# Patient Record
Sex: FEMALE | Race: WHITE | NOT HISPANIC OR LATINO | Employment: OTHER | URBAN - METROPOLITAN AREA
[De-identification: names, ages, dates, MRNs, and addresses within clinical notes are randomized per-mention and may not be internally consistent; named-entity substitution may affect disease eponyms.]

---

## 2017-01-30 ENCOUNTER — ALLSCRIPTS OFFICE VISIT (OUTPATIENT)
Dept: OTHER | Facility: OTHER | Age: 72
End: 2017-01-30

## 2017-06-21 ENCOUNTER — GENERIC CONVERSION - ENCOUNTER (OUTPATIENT)
Dept: OTHER | Facility: OTHER | Age: 72
End: 2017-06-21

## 2017-07-30 DIAGNOSIS — E78.5 HYPERLIPIDEMIA: ICD-10-CM

## 2017-08-21 ENCOUNTER — ALLSCRIPTS OFFICE VISIT (OUTPATIENT)
Dept: OTHER | Facility: OTHER | Age: 72
End: 2017-08-21

## 2018-01-13 VITALS — RESPIRATION RATE: 18 BRPM | DIASTOLIC BLOOD PRESSURE: 76 MMHG | SYSTOLIC BLOOD PRESSURE: 124 MMHG | HEART RATE: 76 BPM

## 2018-01-13 VITALS — RESPIRATION RATE: 16 BRPM | HEART RATE: 60 BPM | SYSTOLIC BLOOD PRESSURE: 130 MMHG | DIASTOLIC BLOOD PRESSURE: 80 MMHG

## 2018-01-21 DIAGNOSIS — E78.5 HYPERLIPIDEMIA: ICD-10-CM

## 2018-01-21 DIAGNOSIS — R03.0 ELEVATED BLOOD PRESSURE READING WITHOUT DIAGNOSIS OF HYPERTENSION: ICD-10-CM

## 2018-01-21 DIAGNOSIS — Z13.29 ENCOUNTER FOR SCREENING FOR OTHER SUSPECTED ENDOCRINE DISORDER: ICD-10-CM

## 2018-01-25 DIAGNOSIS — E78.5 HYPERLIPIDEMIA, UNSPECIFIED HYPERLIPIDEMIA TYPE: Primary | ICD-10-CM

## 2018-01-25 RX ORDER — ATORVASTATIN CALCIUM 20 MG/1
1 TABLET, FILM COATED ORAL DAILY
COMMUNITY
Start: 2015-06-30 | End: 2018-01-25 | Stop reason: SDUPTHER

## 2018-01-26 RX ORDER — ATORVASTATIN CALCIUM 20 MG/1
20 TABLET, FILM COATED ORAL DAILY
Qty: 90 TABLET | Refills: 1 | Status: SHIPPED | OUTPATIENT
Start: 2018-01-26 | End: 2018-06-11 | Stop reason: SDUPTHER

## 2018-03-05 ENCOUNTER — OFFICE VISIT (OUTPATIENT)
Dept: FAMILY MEDICINE CLINIC | Facility: MEDICAL CENTER | Age: 73
End: 2018-03-05
Payer: MEDICARE

## 2018-03-05 VITALS
WEIGHT: 236.6 LBS | RESPIRATION RATE: 18 BRPM | HEART RATE: 80 BPM | BODY MASS INDEX: 34.44 KG/M2 | SYSTOLIC BLOOD PRESSURE: 130 MMHG | DIASTOLIC BLOOD PRESSURE: 76 MMHG

## 2018-03-05 DIAGNOSIS — E78.01 FAMILIAL HYPERCHOLESTEROLEMIA: ICD-10-CM

## 2018-03-05 DIAGNOSIS — R03.0 ELEVATED BP WITHOUT DIAGNOSIS OF HYPERTENSION: Primary | ICD-10-CM

## 2018-03-05 DIAGNOSIS — E66.9 CLASS 1 OBESITY WITHOUT SERIOUS COMORBIDITY WITH BODY MASS INDEX (BMI) OF 34.0 TO 34.9 IN ADULT, UNSPECIFIED OBESITY TYPE: ICD-10-CM

## 2018-03-05 PROCEDURE — 99214 OFFICE O/P EST MOD 30 MIN: CPT | Performed by: FAMILY MEDICINE

## 2018-03-05 RX ORDER — ASPIRIN 81 MG/1
TABLET ORAL
COMMUNITY

## 2018-03-05 RX ORDER — BIOTIN 1 MG
1000 TABLET ORAL
COMMUNITY

## 2018-03-05 RX ORDER — CHLORAL HYDRATE 500 MG
CAPSULE ORAL
COMMUNITY

## 2018-03-05 RX ORDER — PHENOL 1.4 %
AEROSOL, SPRAY (ML) MUCOUS MEMBRANE
COMMUNITY

## 2018-03-06 NOTE — PROGRESS NOTES
Derrick Sewell has been doing well overall  She does not really have any complaints  She still does irregular salt be weighed but she did today  She said that her weight is about the same  She realizes she needs to lose however it has always been a struggle  She has been trying to exercise and tries to get out and walk for 20-30 minutes even with the weather is cold  She no longer needs to see oncology  O: /76 (BP Location: Left arm, Patient Position: Sitting, Cuff Size: Large)   Pulse 80   Resp 18   Wt 107 kg (236 lb 9 6 oz)   BMI 34 44 kg/m²   Neck no adenopathy thyromegaly bruits  Chest clear  Cardiac regular rate without murmur  Abdomen benign  Extremities  No edema    BW 2/23/28  Hgb 44 6  Hct 15 0  Chol 180    Assessment   1  Hyperlipidemia-controlled  2  Elevated hemoglobin hematocrit-overall increases really not that great in the last 6 years  If continues to increase will refer back to her hematology oncologist for possible polycythemia  3   Overweight-discussed diet and exercise  4  Health maintenance-advised Tdap; otherwise immunizations up-to-date  Plan  Blood work and recheck in 6 months

## 2018-06-11 DIAGNOSIS — E78.5 HYPERLIPIDEMIA, UNSPECIFIED HYPERLIPIDEMIA TYPE: ICD-10-CM

## 2018-06-12 RX ORDER — ATORVASTATIN CALCIUM 20 MG/1
20 TABLET, FILM COATED ORAL DAILY
Qty: 90 TABLET | Refills: 3 | Status: SHIPPED | OUTPATIENT
Start: 2018-06-12 | End: 2018-08-14 | Stop reason: SDUPTHER

## 2018-06-22 ENCOUNTER — TELEPHONE (OUTPATIENT)
Dept: FAMILY MEDICINE CLINIC | Facility: MEDICAL CENTER | Age: 73
End: 2018-06-22

## 2018-06-22 NOTE — TELEPHONE ENCOUNTER
----- Message from Yolanda Woodson MD sent at 6/22/2018  4:01 PM EDT -----  Notify mammogram is normal  Repeat 1 year

## 2018-08-14 DIAGNOSIS — E78.5 HYPERLIPIDEMIA, UNSPECIFIED HYPERLIPIDEMIA TYPE: ICD-10-CM

## 2018-08-14 RX ORDER — ATORVASTATIN CALCIUM 20 MG/1
20 TABLET, FILM COATED ORAL DAILY
Qty: 90 TABLET | Refills: 1 | Status: SHIPPED | OUTPATIENT
Start: 2018-08-14 | End: 2018-12-07 | Stop reason: SDUPTHER

## 2018-09-08 LAB — HBA1C MFR BLD HPLC: 5.5 %

## 2018-09-17 ENCOUNTER — OFFICE VISIT (OUTPATIENT)
Dept: FAMILY MEDICINE CLINIC | Facility: MEDICAL CENTER | Age: 73
End: 2018-09-17
Payer: MEDICARE

## 2018-09-17 VITALS — SYSTOLIC BLOOD PRESSURE: 146 MMHG | HEART RATE: 76 BPM | RESPIRATION RATE: 16 BRPM | DIASTOLIC BLOOD PRESSURE: 70 MMHG

## 2018-09-17 DIAGNOSIS — E78.5 HYPERLIPIDEMIA, UNSPECIFIED HYPERLIPIDEMIA TYPE: ICD-10-CM

## 2018-09-17 DIAGNOSIS — R03.0 ELEVATED BP WITHOUT DIAGNOSIS OF HYPERTENSION: ICD-10-CM

## 2018-09-17 DIAGNOSIS — E66.3 OVERWEIGHT: ICD-10-CM

## 2018-09-17 DIAGNOSIS — Z23 NEED FOR SHINGLES VACCINE: Primary | ICD-10-CM

## 2018-09-17 PROCEDURE — 99214 OFFICE O/P EST MOD 30 MIN: CPT | Performed by: FAMILY MEDICINE

## 2018-09-17 RX ORDER — LANOLIN ALCOHOL/MO/W.PET/CERES
1 CREAM (GRAM) TOPICAL
COMMUNITY
End: 2019-09-17 | Stop reason: ALTCHOICE

## 2018-09-17 NOTE — PROGRESS NOTES
Humza Contreras is here for a checkup  She says she is feeling well  Had colonoscopy June Gets  3 year f/u  Saw Gyn   She sees dermatology in Oct    She says she has not been exercising as well she as she had   Humza Contreras is taking her calcium supplement  She declines bone density testing  I have reviewed the patient's medical history in detail and updated the computerized patient record  Review of Systems   Constitutional: Negative for unexpected weight change  Cardiovascular: Negative for chest pain, palpitations and leg swelling  Gastrointestinal: Negative for abdominal pain  Neurological: Negative for dizziness and headaches  O: /70 (Cuff Size: Large)   Pulse 76   Resp 16    BP by me 120/72  Neck no adenopathy, thyromegaly  Chest clear  Car RRR without murmur  Skin hard nodular crusty lesion upper left back    BW 9/8/18  A1C lipid profile normal  CBC shows stable hemoglobin hematocrit at 15 0 and 44 3    Assessment  1  Dyslipidemia-well controlled with simvastatin  2   Elevated blood pressure without diagnosis hypertension-blood pressure is good  3  Lesion on the back-probably benign  Should bring to the attention of Dermatology next month  4   Elevated hemoglobin hematocrit-stable  5     Health maintenance-discussed Shingrix     Declines DEXA scan  Otherwise up-to-date  Plan  As above  Recheck 6 months

## 2018-12-07 DIAGNOSIS — E78.5 HYPERLIPIDEMIA, UNSPECIFIED HYPERLIPIDEMIA TYPE: ICD-10-CM

## 2018-12-09 RX ORDER — ATORVASTATIN CALCIUM 20 MG/1
TABLET, FILM COATED ORAL
Qty: 90 TABLET | Refills: 2 | Status: SHIPPED | OUTPATIENT
Start: 2018-12-09 | End: 2019-09-17 | Stop reason: SDUPTHER

## 2018-12-20 ENCOUNTER — OFFICE VISIT (OUTPATIENT)
Dept: FAMILY MEDICINE CLINIC | Facility: MEDICAL CENTER | Age: 73
End: 2018-12-20
Payer: MEDICARE

## 2018-12-20 VITALS
WEIGHT: 234 LBS | BODY MASS INDEX: 34.06 KG/M2 | SYSTOLIC BLOOD PRESSURE: 120 MMHG | DIASTOLIC BLOOD PRESSURE: 72 MMHG | HEART RATE: 76 BPM | OXYGEN SATURATION: 99 %

## 2018-12-20 DIAGNOSIS — E78.5 HYPERLIPIDEMIA, UNSPECIFIED HYPERLIPIDEMIA TYPE: Primary | ICD-10-CM

## 2018-12-20 DIAGNOSIS — M17.11 PRIMARY OSTEOARTHRITIS OF RIGHT KNEE: ICD-10-CM

## 2018-12-20 DIAGNOSIS — Z01.818 PRE-OP EXAMINATION: ICD-10-CM

## 2018-12-20 PROCEDURE — 99214 OFFICE O/P EST MOD 30 MIN: CPT | Performed by: FAMILY MEDICINE

## 2018-12-20 NOTE — PROGRESS NOTES
Catia tellez is here for preop clearance  Right TKR  Jan 7 2019 Dr Prabha Rowland  General anesthesia  PMH Obesity, Hyperlipidemia, Endometrial cancer  No history of  sleep apnea  No history of bleeding disorders or problems with anesthesia  PSH S/P ZAIN/BSO , Cholecystectomy  No history of problems with anesthesia or bleeding disorders  FH no anesthesia problems or bleeding disorders  SH Nonsmoker, No alcohol   Allergies-Ceclor, Ibuprofen, Percodan   Meds Atorvastatin  Supplements include aspirin, calcium, vitamin D3, magnesium, multivitamins and Omega 3 fatty acid      I have reviewed the patient's medical history in detail and updated the computerized patient record  Review of Systems   Constitutional: Negative for fever  HENT: Negative for congestion  Eyes: Negative for discharge  Respiratory: Negative for chest tightness and shortness of breath  Cardiovascular: Negative for chest pain, palpitations and leg swelling  Gastrointestinal: Positive for constipation  Negative for abdominal pain and diarrhea  Gets constipation if does not eat enough fruit   Genitourinary: Negative for difficulty urinating  Skin: Negative for rash  Neurological: Negative for dizziness       O: /72 (BP Location: Left arm, Patient Position: Sitting, Cuff Size: Large)   Pulse 76   Wt 106 kg (234 lb)   SpO2 99%   BMI 34 06 kg/m²   Physical Exam   Constitutional: She is oriented to person, place, and time  She appears well-developed and well-nourished  HENT:   Head: Normocephalic  Right Ear: External ear normal    Left Ear: External ear normal    Nose: Nose normal    Mouth/Throat: Oropharynx is clear and moist    Eyes: Pupils are equal, round, and reactive to light  Conjunctivae and EOM are normal  No scleral icterus  Neck: Normal range of motion  Neck supple  Carotid bruit is not present  No thyroid mass and no thyromegaly present     Cardiovascular: Normal rate, regular rhythm, normal heart sounds and intact distal pulses  Pulses:       Femoral pulses are 2+ on the right side, and 2+ on the left side  Popliteal pulses are 2+ on the right side, and 2+ on the left side  Dorsalis pedis pulses are 2+ on the right side, and 2+ on the left side  Posterior tibial pulses are 2+ on the right side, and 2+ on the left side  Pulmonary/Chest: Effort normal and breath sounds normal  No respiratory distress  She has no wheezes  She has no rales  Abdominal: Soft  Normal aorta and bowel sounds are normal  She exhibits no distension and no mass  There is no hepatosplenomegaly  There is no tenderness  Musculoskeletal: Normal range of motion  She exhibits no edema  Knock kneed  Right knee larger than the left   Lymphadenopathy:        Head (right side): No submandibular and no occipital adenopathy present  Head (left side): No submandibular and no occipital adenopathy present  She has no cervical adenopathy  Right: No inguinal and no supraclavicular adenopathy present  Left: No inguinal and no supraclavicular adenopathy present  Neurological: She is oriented to person, place, and time  Skin: No lesion and no rash noted  Psychiatric: She has a normal mood and affect   Her behavior is normal      EKG shows incomplete right bundle-branch block and is unchanged from 2011  Blood work shows  normal CBC Chem screen PT PTT    Assessment  Medically stable    Plan  Medically clear for surgery

## 2019-03-18 ENCOUNTER — OFFICE VISIT (OUTPATIENT)
Dept: FAMILY MEDICINE CLINIC | Facility: MEDICAL CENTER | Age: 74
End: 2019-03-18
Payer: MEDICARE

## 2019-03-18 VITALS — DIASTOLIC BLOOD PRESSURE: 70 MMHG | RESPIRATION RATE: 16 BRPM | HEART RATE: 80 BPM | SYSTOLIC BLOOD PRESSURE: 144 MMHG

## 2019-03-18 DIAGNOSIS — R03.0 ELEVATED BP WITHOUT DIAGNOSIS OF HYPERTENSION: ICD-10-CM

## 2019-03-18 DIAGNOSIS — E66.9 CLASS 1 OBESITY WITHOUT SERIOUS COMORBIDITY WITH BODY MASS INDEX (BMI) OF 34.0 TO 34.9 IN ADULT, UNSPECIFIED OBESITY TYPE: Primary | ICD-10-CM

## 2019-03-18 DIAGNOSIS — C54.1 ENDOMETRIAL CANCER (HCC): ICD-10-CM

## 2019-03-18 DIAGNOSIS — E78.01 FAMILIAL HYPERCHOLESTEROLEMIA: ICD-10-CM

## 2019-03-18 PROCEDURE — 99213 OFFICE O/P EST LOW 20 MIN: CPT | Performed by: FAMILY MEDICINE

## 2019-03-18 NOTE — PROGRESS NOTES
Corbindo Petersessence is here for 6 month f/u  Her TKR went well  She  is recovering although feels slowly  She  is still going to PT  Twice a week  BP was good in the hospital    No complaints  O: /70 (BP Location: Left arm, Patient Position: Sitting, Cuff Size: Large)   Pulse 80   Resp 16   Neck no adenopathy  Chest clear  Car RRR  Without murmur  Both knees same size  Incision healing well right knee  Assessment  1  Status post right total knee replacement seems to be progressing well  2  Dyslipidemia-taking atorvastatin  3  History of elevated blood pressure-blood pressure normal    Plan  Blood work and recheck 6 months

## 2019-05-07 ENCOUNTER — TELEPHONE (OUTPATIENT)
Dept: FAMILY MEDICINE CLINIC | Facility: MEDICAL CENTER | Age: 74
End: 2019-05-07

## 2019-09-05 DIAGNOSIS — R03.0 ELEVATED BP WITHOUT DIAGNOSIS OF HYPERTENSION: ICD-10-CM

## 2019-09-05 DIAGNOSIS — E66.9 CLASS 1 OBESITY WITHOUT SERIOUS COMORBIDITY WITH BODY MASS INDEX (BMI) OF 34.0 TO 34.9 IN ADULT, UNSPECIFIED OBESITY TYPE: Primary | ICD-10-CM

## 2019-09-05 DIAGNOSIS — E78.01 FAMILIAL HYPERCHOLESTEROLEMIA: ICD-10-CM

## 2019-09-05 LAB — HBA1C MFR BLD HPLC: 5.5 %

## 2019-09-17 ENCOUNTER — OFFICE VISIT (OUTPATIENT)
Dept: FAMILY MEDICINE CLINIC | Facility: MEDICAL CENTER | Age: 74
End: 2019-09-17
Payer: MEDICARE

## 2019-09-17 ENCOUNTER — TELEPHONE (OUTPATIENT)
Dept: FAMILY MEDICINE CLINIC | Facility: MEDICAL CENTER | Age: 74
End: 2019-09-17

## 2019-09-17 VITALS
WEIGHT: 235.13 LBS | SYSTOLIC BLOOD PRESSURE: 140 MMHG | RESPIRATION RATE: 16 BRPM | BODY MASS INDEX: 34.22 KG/M2 | DIASTOLIC BLOOD PRESSURE: 80 MMHG | HEART RATE: 80 BPM

## 2019-09-17 DIAGNOSIS — E78.01 FAMILIAL HYPERCHOLESTEROLEMIA: Primary | ICD-10-CM

## 2019-09-17 DIAGNOSIS — Z23 NEED FOR SHINGLES VACCINE: Primary | ICD-10-CM

## 2019-09-17 DIAGNOSIS — E78.5 HYPERLIPIDEMIA, UNSPECIFIED HYPERLIPIDEMIA TYPE: ICD-10-CM

## 2019-09-17 DIAGNOSIS — C55 MALIGNANT NEOPLASM OF UTERUS, UNSPECIFIED SITE (HCC): ICD-10-CM

## 2019-09-17 PROCEDURE — 99213 OFFICE O/P EST LOW 20 MIN: CPT | Performed by: FAMILY MEDICINE

## 2019-09-17 RX ORDER — ATORVASTATIN CALCIUM 20 MG/1
20 TABLET, FILM COATED ORAL DAILY
Qty: 90 TABLET | Refills: 2 | Status: SHIPPED | OUTPATIENT
Start: 2019-09-17 | End: 2020-05-29

## 2019-09-17 NOTE — PROGRESS NOTES
Ines Wallace is here for 6 month checkup  She has a  a torn Achilles tendon  Seeing Dr Cathy Carrero  Getting PT  Fabiola Dura Dr Ashley Price at [place] her routine care as well as follow-up of her uterine cancer  Will be getting flu shot in October  Has a blood pressure monitor at home but doesn't take it  She is taking atorvastatin 20 mg daily and doing well  No side effects  She has made some dietary changes along with her daughter  However she said she has not lost any weight but believes it is because she unable to exercise due to her foot problem  O: /80   Pulse 80   Resp 16   Wt 107 kg (235 lb 2 oz)   BMI 34 22 kg/m²   BP by me 130/80 large cuff  Physical Exam   Constitutional: She appears well-developed and well-nourished  No distress  Neck: Carotid bruit is not present  No thyromegaly present  Cardiovascular: Normal rate, regular rhythm, normal heart sounds and intact distal pulses  Pulmonary/Chest: Effort normal and breath sounds normal    Abdominal: Soft  Bowel sounds are normal  She exhibits no mass  There is no hepatomegaly  There is no tenderness  Musculoskeletal: She exhibits no edema  Lymphadenopathy:     She has no cervical adenopathy  BW 09/05/2019    cholesterol 136  A1c 5 5    Assessment  1  Hyperlipidemia-well controlled with atorvastatin 20 mg  Would continue present dose  2  Obesity -encouraged her dietary changes and exercise as she is able  3  Health maintenance-updated immunizations  Gets flu shot pharmacy  Discussed Shingrix  We also discussed current recommendations regarding baby aspirin   Plan  As above  Shingrix  Recheck 6 months

## 2019-09-18 NOTE — TELEPHONE ENCOUNTER
Get CBC CMP lipid profile before next appointment in March    Diagnosis hyperlipidemia and history uterine cancer

## 2019-10-24 ENCOUNTER — TELEPHONE (OUTPATIENT)
Dept: FAMILY MEDICINE CLINIC | Facility: MEDICAL CENTER | Age: 74
End: 2019-10-24

## 2019-10-24 NOTE — TELEPHONE ENCOUNTER
Pt got a bill from the lab work she had done  She has the bill and will try faxing it since she lives in [facility] but if we do not receive it in a timely manner she will drop it off

## 2020-05-26 DIAGNOSIS — E78.5 HYPERLIPIDEMIA, UNSPECIFIED HYPERLIPIDEMIA TYPE: ICD-10-CM

## 2020-05-29 RX ORDER — ATORVASTATIN CALCIUM 20 MG/1
TABLET, FILM COATED ORAL
Qty: 90 TABLET | Refills: 2 | Status: SHIPPED | OUTPATIENT
Start: 2020-05-29 | End: 2020-07-06 | Stop reason: SDUPTHER

## 2020-06-08 ENCOUNTER — TELEPHONE (OUTPATIENT)
Dept: FAMILY MEDICINE CLINIC | Facility: MEDICAL CENTER | Age: 75
End: 2020-06-08

## 2020-06-09 DIAGNOSIS — E78.01 FAMILIAL HYPERCHOLESTEROLEMIA: Primary | ICD-10-CM

## 2020-06-09 DIAGNOSIS — E66.3 OVERWEIGHT: ICD-10-CM

## 2020-07-06 DIAGNOSIS — E78.5 HYPERLIPIDEMIA, UNSPECIFIED HYPERLIPIDEMIA TYPE: ICD-10-CM

## 2020-07-07 RX ORDER — ATORVASTATIN CALCIUM 20 MG/1
20 TABLET, FILM COATED ORAL DAILY
Qty: 90 TABLET | Refills: 2 | Status: SHIPPED | OUTPATIENT
Start: 2020-07-07

## 2021-01-27 ENCOUNTER — IMMUNIZATIONS (OUTPATIENT)
Dept: FAMILY MEDICINE CLINIC | Facility: HOSPITAL | Age: 76
End: 2021-01-27

## 2021-01-27 DIAGNOSIS — Z23 ENCOUNTER FOR IMMUNIZATION: Primary | ICD-10-CM

## 2021-01-27 PROCEDURE — 91300 SARS-COV-2 / COVID-19 MRNA VACCINE (PFIZER-BIONTECH) 30 MCG: CPT

## 2021-01-27 PROCEDURE — 0001A SARS-COV-2 / COVID-19 MRNA VACCINE (PFIZER-BIONTECH) 30 MCG: CPT

## 2021-02-17 ENCOUNTER — IMMUNIZATIONS (OUTPATIENT)
Dept: FAMILY MEDICINE CLINIC | Facility: HOSPITAL | Age: 76
End: 2021-02-17

## 2021-02-17 DIAGNOSIS — Z23 ENCOUNTER FOR IMMUNIZATION: Primary | ICD-10-CM

## 2021-02-17 PROCEDURE — 0002A SARS-COV-2 / COVID-19 MRNA VACCINE (PFIZER-BIONTECH) 30 MCG: CPT

## 2021-02-17 PROCEDURE — 91300 SARS-COV-2 / COVID-19 MRNA VACCINE (PFIZER-BIONTECH) 30 MCG: CPT

## 2022-06-10 ENCOUNTER — ESTABLISHED COMPREHENSIVE EXAM (OUTPATIENT)
Dept: URBAN - METROPOLITAN AREA CLINIC 6 | Facility: CLINIC | Age: 77
End: 2022-06-10

## 2022-06-10 DIAGNOSIS — H35.362: ICD-10-CM

## 2022-06-10 DIAGNOSIS — H43.22: ICD-10-CM

## 2022-06-10 DIAGNOSIS — H35.371: ICD-10-CM

## 2022-06-10 DIAGNOSIS — H04.123: ICD-10-CM

## 2022-06-10 DIAGNOSIS — H35.3120: ICD-10-CM

## 2022-06-10 DIAGNOSIS — H25.813: ICD-10-CM

## 2022-06-10 PROCEDURE — 92134 CPTRZ OPH DX IMG PST SGM RTA: CPT

## 2022-06-10 PROCEDURE — 92014 COMPRE OPH EXAM EST PT 1/>: CPT

## 2022-06-10 ASSESSMENT — TONOMETRY
OD_IOP_MMHG: 19
OS_IOP_MMHG: 19

## 2022-06-10 ASSESSMENT — VISUAL ACUITY
OU_CC: J2
OD_CC: 20/50
OS_PH: 20/40-2
OS_CC: 20/50
OD_GLARE: CF 2FT
OS_GLARE: CF 2FT

## 2022-08-02 ENCOUNTER — PRE-OP CATARACT MEASUREMENTS (OUTPATIENT)
Dept: URBAN - METROPOLITAN AREA CLINIC 6 | Facility: CLINIC | Age: 77
End: 2022-08-02

## 2022-08-02 DIAGNOSIS — H43.22: ICD-10-CM

## 2022-08-02 DIAGNOSIS — H35.362: ICD-10-CM

## 2022-08-02 DIAGNOSIS — H35.371: ICD-10-CM

## 2022-08-02 DIAGNOSIS — H25.813: ICD-10-CM

## 2022-08-02 PROCEDURE — 92014 COMPRE OPH EXAM EST PT 1/>: CPT

## 2022-08-02 PROCEDURE — 76519 ECHO EXAM OF EYE: CPT

## 2022-08-02 ASSESSMENT — VISUAL ACUITY
OU_CC: J1
OD_CC: 20/70
OD_PH: 20/60
OS_PH: 20/50-2
OS_CC: 20/60

## 2022-08-02 ASSESSMENT — KERATOMETRY
OS_K2POWER_DIOPTERS: 46.75
OS_AXISANGLE_DEGREES: 155
OD_K2POWER_DIOPTERS: 46.50
OD_AXISANGLE2_DEGREES: 162
OD_AXISANGLE_DEGREES: 72
OS_AXISANGLE2_DEGREES: 65
OS_K1POWER_DIOPTERS: 45.00
OD_K1POWER_DIOPTERS: 44.50

## 2022-08-02 ASSESSMENT — TONOMETRY
OS_IOP_MMHG: 17
OD_IOP_MMHG: 15

## 2022-08-22 ENCOUNTER — SURGERY/PROCEDURE (OUTPATIENT)
Dept: URBAN - METROPOLITAN AREA SURGICAL CENTER 6 | Facility: SURGICAL CENTER | Age: 77
End: 2022-08-22

## 2022-08-22 DIAGNOSIS — H25.812: ICD-10-CM

## 2022-08-22 PROCEDURE — 66984 XCAPSL CTRC RMVL W/O ECP: CPT

## 2022-08-22 PROCEDURE — 68841 INSJ RX ELUT IMPLT LAC CANAL: CPT

## 2022-08-23 ENCOUNTER — 1 DAY POST-OP (OUTPATIENT)
Dept: URBAN - METROPOLITAN AREA CLINIC 6 | Facility: CLINIC | Age: 77
End: 2022-08-23

## 2022-08-23 DIAGNOSIS — Z96.1: ICD-10-CM

## 2022-08-23 PROCEDURE — 99024 POSTOP FOLLOW-UP VISIT: CPT

## 2022-08-23 ASSESSMENT — KERATOMETRY
OS_AXISANGLE2_DEGREES: 65
OD_AXISANGLE_DEGREES: 72
OS_K1POWER_DIOPTERS: 45.00
OD_K2POWER_DIOPTERS: 46.50
OD_AXISANGLE2_DEGREES: 162
OD_K1POWER_DIOPTERS: 44.50
OS_AXISANGLE_DEGREES: 155
OS_K2POWER_DIOPTERS: 46.75

## 2022-08-23 ASSESSMENT — TONOMETRY: OS_IOP_MMHG: 21

## 2022-08-23 ASSESSMENT — VISUAL ACUITY: OS_SC: 20/20

## 2022-08-24 ASSESSMENT — KERATOMETRY
OS_K1POWER_DIOPTERS: 45.00
OS_K2POWER_DIOPTERS: 46.75
OD_K1POWER_DIOPTERS: 44.50
OD_K2POWER_DIOPTERS: 46.50
OD_AXISANGLE2_DEGREES: 162
OD_AXISANGLE_DEGREES: 72
OS_AXISANGLE2_DEGREES: 65
OS_AXISANGLE_DEGREES: 155

## 2022-08-30 ENCOUNTER — 1 WEEK POST-OP (OUTPATIENT)
Dept: URBAN - METROPOLITAN AREA CLINIC 6 | Facility: CLINIC | Age: 77
End: 2022-08-30

## 2022-08-30 DIAGNOSIS — Z96.1: ICD-10-CM

## 2022-08-30 DIAGNOSIS — H25.811: ICD-10-CM

## 2022-08-30 PROCEDURE — 99024 POSTOP FOLLOW-UP VISIT: CPT

## 2022-08-30 PROCEDURE — 92136 OPHTHALMIC BIOMETRY: CPT | Mod: 26,RT

## 2022-08-30 ASSESSMENT — KERATOMETRY
OS_K2POWER_DIOPTERS: 46.75
OD_AXISANGLE_DEGREES: 72
OD_K1POWER_DIOPTERS: 44.50
OS_K1POWER_DIOPTERS: 45.00
OS_AXISANGLE2_DEGREES: 65
OD_AXISANGLE2_DEGREES: 162
OS_AXISANGLE_DEGREES: 155
OD_K2POWER_DIOPTERS: 46.50

## 2022-08-30 ASSESSMENT — TONOMETRY
OS_IOP_MMHG: 17
OD_IOP_MMHG: 17

## 2022-08-30 ASSESSMENT — VISUAL ACUITY
OS_SC: 20/30-2
OD_CC: 20/60

## 2022-09-12 ENCOUNTER — SURGERY/PROCEDURE (OUTPATIENT)
Dept: URBAN - METROPOLITAN AREA SURGICAL CENTER 6 | Facility: SURGICAL CENTER | Age: 77
End: 2022-09-12

## 2022-09-12 DIAGNOSIS — H25.811: ICD-10-CM

## 2022-09-12 PROCEDURE — 68841 INSJ RX ELUT IMPLT LAC CANAL: CPT | Mod: 79,RT

## 2022-09-12 PROCEDURE — 66984 XCAPSL CTRC RMVL W/O ECP: CPT | Mod: 79,RT

## 2022-09-13 ENCOUNTER — 1 DAY POST-OP (OUTPATIENT)
Dept: URBAN - METROPOLITAN AREA CLINIC 6 | Facility: CLINIC | Age: 77
End: 2022-09-13

## 2022-09-13 DIAGNOSIS — Z96.1: ICD-10-CM

## 2022-09-13 PROCEDURE — 99024 POSTOP FOLLOW-UP VISIT: CPT

## 2022-09-13 ASSESSMENT — VISUAL ACUITY
OS_SC: 20/25-2
OD_PH: 20/40
OD_SC: 20/60

## 2022-09-13 ASSESSMENT — KERATOMETRY
OD_AXISANGLE2_DEGREES: 162
OS_K2POWER_DIOPTERS: 46.75
OD_K1POWER_DIOPTERS: 44.50
OD_K2POWER_DIOPTERS: 46.50
OD_AXISANGLE_DEGREES: 72
OD_K2POWER_DIOPTERS: 46.50
OD_K1POWER_DIOPTERS: 44.50
OS_K2POWER_DIOPTERS: 46.75
OS_AXISANGLE_DEGREES: 155
OS_K1POWER_DIOPTERS: 45.00
OS_AXISANGLE2_DEGREES: 65
OD_AXISANGLE_DEGREES: 72
OS_K1POWER_DIOPTERS: 45.00
OS_AXISANGLE2_DEGREES: 65
OS_AXISANGLE_DEGREES: 155
OD_AXISANGLE2_DEGREES: 162

## 2022-09-13 ASSESSMENT — TONOMETRY
OS_IOP_MMHG: 15
OD_IOP_MMHG: 14

## 2022-09-20 ENCOUNTER — 1 WEEK POST-OP (OUTPATIENT)
Dept: URBAN - METROPOLITAN AREA CLINIC 6 | Facility: CLINIC | Age: 77
End: 2022-09-20

## 2022-09-20 DIAGNOSIS — Z96.1: ICD-10-CM

## 2022-09-20 PROCEDURE — 99024 POSTOP FOLLOW-UP VISIT: CPT

## 2022-09-20 ASSESSMENT — TONOMETRY
OS_IOP_MMHG: 15
OD_IOP_MMHG: 12

## 2022-09-20 ASSESSMENT — KERATOMETRY
OS_AXISANGLE_DEGREES: 155
OS_K2POWER_DIOPTERS: 46.75
OD_AXISANGLE2_DEGREES: 162
OD_K1POWER_DIOPTERS: 44.50
OD_K2POWER_DIOPTERS: 46.50
OS_AXISANGLE2_DEGREES: 65
OS_K1POWER_DIOPTERS: 45.00
OD_AXISANGLE_DEGREES: 72

## 2022-09-20 ASSESSMENT — VISUAL ACUITY
OD_SC: 20/50-2
OS_SC: 20/25-1

## 2022-10-18 ENCOUNTER — FOLLOW UP (OUTPATIENT)
Dept: URBAN - METROPOLITAN AREA CLINIC 6 | Facility: CLINIC | Age: 77
End: 2022-10-18

## 2022-10-18 DIAGNOSIS — Z96.1: ICD-10-CM

## 2022-10-18 DIAGNOSIS — H35.371: ICD-10-CM

## 2022-10-18 PROCEDURE — 99024 POSTOP FOLLOW-UP VISIT: CPT

## 2022-10-18 ASSESSMENT — KERATOMETRY
OD_AXISANGLE2_DEGREES: 162
OS_AXISANGLE2_DEGREES: 65
OS_K2POWER_DIOPTERS: 46.75
OS_K1POWER_DIOPTERS: 45.00
OD_K2POWER_DIOPTERS: 46.50
OD_AXISANGLE_DEGREES: 72
OD_K1POWER_DIOPTERS: 44.50
OS_AXISANGLE_DEGREES: 155

## 2022-10-18 ASSESSMENT — TONOMETRY
OD_IOP_MMHG: 13
OS_IOP_MMHG: 15

## 2022-10-18 ASSESSMENT — VISUAL ACUITY
OD_PH: 20/30
OD_SC: 20/40
OS_SC: 20/25

## 2023-02-09 ENCOUNTER — FOLLOW UP (OUTPATIENT)
Dept: URBAN - METROPOLITAN AREA CLINIC 6 | Facility: CLINIC | Age: 78
End: 2023-02-09

## 2023-02-09 DIAGNOSIS — H43.22: ICD-10-CM

## 2023-02-09 DIAGNOSIS — Z96.1: ICD-10-CM

## 2023-02-09 DIAGNOSIS — H35.362: ICD-10-CM

## 2023-02-09 DIAGNOSIS — H35.371: ICD-10-CM

## 2023-02-09 DIAGNOSIS — H35.3120: ICD-10-CM

## 2023-02-09 PROCEDURE — 92134 CPTRZ OPH DX IMG PST SGM RTA: CPT

## 2023-02-09 PROCEDURE — 92012 INTRM OPH EXAM EST PATIENT: CPT

## 2023-02-09 ASSESSMENT — VISUAL ACUITY
OD_PH: 20/40+1
OD_SC: 20/40
OS_SC: 20/30-1

## 2023-02-09 ASSESSMENT — KERATOMETRY
OS_AXISANGLE2_DEGREES: 65
OS_K1POWER_DIOPTERS: 45.00
OD_K2POWER_DIOPTERS: 46.50
OD_AXISANGLE_DEGREES: 72
OS_AXISANGLE_DEGREES: 155
OD_K1POWER_DIOPTERS: 44.50
OD_AXISANGLE2_DEGREES: 162
OS_K2POWER_DIOPTERS: 46.75

## 2023-08-15 ENCOUNTER — FOLLOW UP (OUTPATIENT)
Dept: URBAN - METROPOLITAN AREA CLINIC 6 | Facility: CLINIC | Age: 78
End: 2023-08-15

## 2023-08-15 DIAGNOSIS — H35.371: ICD-10-CM

## 2023-08-15 DIAGNOSIS — H35.3120: ICD-10-CM

## 2023-08-15 DIAGNOSIS — H35.362: ICD-10-CM

## 2023-08-15 DIAGNOSIS — H43.22: ICD-10-CM

## 2023-08-15 DIAGNOSIS — Z96.1: ICD-10-CM

## 2023-08-15 PROCEDURE — 92134 CPTRZ OPH DX IMG PST SGM RTA: CPT

## 2023-08-15 PROCEDURE — 92014 COMPRE OPH EXAM EST PT 1/>: CPT

## 2023-08-15 ASSESSMENT — VISUAL ACUITY
OD_SC: 20/30-2
OS_SC: 20/30-2
OU_CC: J3

## 2023-08-15 ASSESSMENT — TONOMETRY
OD_IOP_MMHG: 14
OS_IOP_MMHG: 12

## 2024-02-20 ENCOUNTER — FOLLOW UP (OUTPATIENT)
Dept: URBAN - METROPOLITAN AREA CLINIC 6 | Facility: CLINIC | Age: 79
End: 2024-02-20

## 2024-02-20 DIAGNOSIS — H35.3120: ICD-10-CM

## 2024-02-20 DIAGNOSIS — H35.371: ICD-10-CM

## 2024-02-20 DIAGNOSIS — Z96.1: ICD-10-CM

## 2024-02-20 DIAGNOSIS — H35.362: ICD-10-CM

## 2024-02-20 DIAGNOSIS — H43.22: ICD-10-CM

## 2024-02-20 DIAGNOSIS — H04.123: ICD-10-CM

## 2024-02-20 DIAGNOSIS — H26.491: ICD-10-CM

## 2024-02-20 LAB — BLOOD WORKUP: NORMAL

## 2024-02-20 PROCEDURE — 92012 INTRM OPH EXAM EST PATIENT: CPT

## 2024-02-20 PROCEDURE — 92134 CPTRZ OPH DX IMG PST SGM RTA: CPT

## 2024-02-20 ASSESSMENT — VISUAL ACUITY
OD_PH: 20/40
OD_SC: 20/60
OS_SC: 20/25+2

## 2024-02-20 ASSESSMENT — TONOMETRY
OD_IOP_MMHG: 12
OS_IOP_MMHG: 10

## 2024-08-13 ENCOUNTER — ESTABLISHED COMPREHENSIVE EXAM (OUTPATIENT)
Dept: URBAN - METROPOLITAN AREA CLINIC 6 | Facility: CLINIC | Age: 79
End: 2024-08-13

## 2024-08-13 DIAGNOSIS — H43.22: ICD-10-CM

## 2024-08-13 DIAGNOSIS — H35.3120: ICD-10-CM

## 2024-08-13 DIAGNOSIS — H02.402: ICD-10-CM

## 2024-08-13 DIAGNOSIS — H35.362: ICD-10-CM

## 2024-08-13 DIAGNOSIS — H26.491: ICD-10-CM

## 2024-08-13 DIAGNOSIS — Z96.1: ICD-10-CM

## 2024-08-13 DIAGNOSIS — H35.371: ICD-10-CM

## 2024-08-13 DIAGNOSIS — H04.123: ICD-10-CM

## 2024-08-13 PROCEDURE — 92134 CPTRZ OPH DX IMG PST SGM RTA: CPT

## 2024-08-13 PROCEDURE — 92014 COMPRE OPH EXAM EST PT 1/>: CPT

## 2024-08-13 ASSESSMENT — TONOMETRY
OS_IOP_MMHG: 13
OD_IOP_MMHG: 15

## 2024-08-13 ASSESSMENT — VISUAL ACUITY
OD_SC: 20/25-2
OU_SC: J2
OS_SC: 20/20
OU_SC: 20/25

## 2025-08-14 ENCOUNTER — ESTABLISHED COMPREHENSIVE EXAM (OUTPATIENT)
Dept: URBAN - METROPOLITAN AREA CLINIC 6 | Facility: CLINIC | Age: 80
End: 2025-08-14

## 2025-08-14 DIAGNOSIS — H35.371: ICD-10-CM

## 2025-08-14 DIAGNOSIS — H35.362: ICD-10-CM

## 2025-08-14 DIAGNOSIS — H43.22: ICD-10-CM

## 2025-08-14 DIAGNOSIS — H35.3120: ICD-10-CM

## 2025-08-14 DIAGNOSIS — H04.123: ICD-10-CM

## 2025-08-14 DIAGNOSIS — H26.491: ICD-10-CM

## 2025-08-14 DIAGNOSIS — Z96.1: ICD-10-CM

## 2025-08-14 DIAGNOSIS — H02.402: ICD-10-CM

## 2025-08-14 PROCEDURE — 92134 CPTRZ OPH DX IMG PST SGM RTA: CPT

## 2025-08-14 PROCEDURE — 92014 COMPRE OPH EXAM EST PT 1/>: CPT

## 2025-08-14 ASSESSMENT — TONOMETRY
OD_IOP_MMHG: 14
OS_IOP_MMHG: 14

## 2025-08-14 ASSESSMENT — VISUAL ACUITY
OD_SC: 20/40
OS_SC: 20/30